# Patient Record
(demographics unavailable — no encounter records)

---

## 2024-10-18 NOTE — ASSESSMENT
[FreeTextEntry1] : 40 year old premenopausal woman with FHx of breast cancer and a history of R DCIS ER+ %/AK+ % s/p right breast excision on 2/3/23 and re-excision on 3/17/23, adjuvant RT (completed 9/2023), and tamoxifen since 9/2023 is here for f/u.  Previously under the care of Dr. Izaguirre.   Tolerating tamoxifen well.  No clinical evidence of disease.  She was previously on Pristiq but was switched to Zoloft then Prozac this year.  Advised her that Zoloft and Prozac are strong CYP2D6 inhibitors which may decrease serum concentrations of tamoxifen.  She will discuss alternate antidepressant/anxiety medication with her psychiatrist.  - Continue tamoxifen. - 12/6/24 MG/US - Continue follow up with Dr. Ibarra. - Continue yearly PCP, gyn, ophtho exams. - F/u 6 months.  78

## 2024-10-18 NOTE — HISTORY OF PRESENT ILLNESS
[de-identified] : 40 year old female with a history of Stage 0 pTisNxMx RIGHT DCIS ER+ %/OH+ % s/p right breast excision on 2/3/23 and re-excision for close margin on 3/17/23 (Brie Ibarra), adjuvant RT (completed 9/27/23), tamoxifen since 9/2023 is here for f/u.  She was previously under the care of Dr. Claudette Izaguirre.  FHx: Mother w/breast at 57, maternal grandmother with breast at 74, paternal grandmother with breast 74. Had genetic testing >5 years ago, which was negative, states likely checked BRCA gene only. States genetic testing for her mother was negative as well.   10/28/22 - [facility]: Screening Mammogram - Density D. There is nodular asymmetry slightly upper middle third right MLO projection which may represent superimposed breast tissue. Correlate is not demonstrated on the CC projection. BIRADS 0  11/03/22 - [facility]: Diagnostic Mammogram, Right & US Breast Complete, Right - Asymmetry in the upper aspect of the right breast is changeable with compression, consistent with normal glandular tissue. No discrete mass is identified. There is a grouping of small round and amorphous calcifications at about 11 o'clock position of the right breast middle to posterior depth. No suspicious solid mass.  Several small simple cysts. 0.7 cm cluster of microcysts at 11:00 position 2 cm from the nipple. BIRADS 4A.  12/02/22 [facility]: Right stereotactic biopsy: Top hat clip in appropriate position. Pathology reveals atypical ductal hyperplasia and atypical lobular hyperplasia, fibrocystic changes.  02/03/23 - [facility]: Breast Imaging Report - IMPRESSION: Specimen radiograph demonstrates the tophat clip and Dipti  clip. Correlation with final histopathology is recommended. FINAL HISTOPATHOLOGY: ADH, several foci. LCIS. Recent needle biopsy site with clip material, remote hemorrhage, chronic inflammation, granulation tissue, and reparative changes. Proliferative fibrocystic changes including usual ductal hyperplasia, apocrine metaplasia, adenosis, duct ectasia, and stromal fibrosis. Recommend annual screening mammogram and breast ultrasound due October 2023.  2/3/23 Right breast 11:00 excision (amendment): Ductal carcinoma in situ 4 mm, in 5 out of 10 tissue sections, solid and cribriform types, low nuclear grade, with NO luminal necrosis, with a focal direct involvement of inked medial-inferior margin. ER %, OH % Lobular carcinoma in situ with mild nuclear atypia (LCIS) (#A7). No evidence of invasive components. Adjacent needle biopsy site with clip material, remote hemorrhage, chronic inflammation, granulation tissue, and reparative changes. Proliferative fibrocystic changes, including usual ductal hyperplasia (UDH) (#A6), apocrine metaplasia, adenosis, duct ectasia, and stromal fibrosis.  3/17/23 Re-excision of right breast inferior-medial margin: Residual ductal carcinoma in situ  (DCIS ), several small foci, the largest horizontal span 2 mm, present in 3 out of 10 tissue sections, solid and cribriform types, low nuclear grade, with NO luminal necrosis, present within 0.1 - 1 mm of inked medial-inferior margin Lobular carcinoma in situ (LCIS). No evidence of invasive component is identified in the specimen received. Adjacent excisional biopsy site, showing chronic inflammation, giant cell tissue reaction, granulation tissue, and reparative changes. Fibrocystic changes. Addendum: The re-excisional slides show a wide spectrum of morphologic changes, including usual ductal hyperplasia (UDH), atypical ductal hyperplasia (ADH), and one or two foci of low-grade ductal carcinoma in situ (DCIS).  In addition, marked cauterized artifacts are noted.  The cauterized atyp ical focus present within 0.1 mm of the inked inferior margin may represent a UDH.  The focus of definite low-grade DCIS does present and measures at least 3 mm from the closest inked inferior margin.  In conclusion, all of the inked re-excisional margins are benign and free of DCIS.  08/09/23: [facility]: Diagnostic Mammogram Right and Ultrasound Breast Complete Right: Density C, Impression - No mammographic or sonographic evidence of malignancy. BI-RADS 2  12/05/23: ([facility]) Diagnostic Mammogram Bilateral and Breast Ultrasound: Density C, Impression - No mammographic findings of malignancy. Benign appearing right breast cyst 10:00 axis. probably benign lesions right 9:00 N2, right 9:00 N1, right 6:00 and left 8:00 axis. 6 month follow up targeted bilateral breast ultrasound. BI-RADS 3  6/6/24 b/l breast US ([facility]): Stable bilateral cysts.  8/30/24 MRI breast ([facility]): In the central inner right breast 3:00 2cmFN, there is a 4 mm focus of type 1 non-mass enhancement. In the left breast 5:00 7cm FN there is a 19 mm patchy area of type 1 nonmass enhancement. Targeted b/l breast US is suggested for more definitive characterization of these findings.  If no sonographic correlated can be identified, a six month short term f/u breast MRI would be appropriate to assess stability. BIRADS 3.  9/18/24 targeted b/l breast US ([facility]): No sonographic evidence of malignancy. Specifically, there is no sonographic abnormality corresponding to the areas of type 1 non-mass enhancement identified in the right breast 3 o'clock position, 2 cm from the nipple and left breast 5 o'clock position, 7 cm from the nipple on the previous breast MRI from 08/30/2024. A short-term follow-up breast MRI with and without contrast in 5-6 months is suggested to ensure stability of these findings. Annual screening mammography and bilateral whole breast ultrasound are also recommended, due in December 2024. BI-RADS 3 [de-identified] : She is taking tamoxifen daily.  Feels hot at times.  Gets migraines prior to menses, but lately it has been ok.  Has not taken Aimovig in awhile. Denies sob/sharma, and abnormal vaginal bleeding. She is scheduled for MG/US on 12/6/24.  She was previously on Pristiq, but was switched to Zoloft which she took for 3-4 months and now Prozac which she started last Monday.  PCP: 11/2024 GYN: 10/25/23, due Eye exam: 11-12/2023, annually, no vision changes LMP: 10/17/24 Exercise: physical active daily Diet: high protein, low sugar, ETOH 5-6 drinks on Friday.

## 2024-10-18 NOTE — REVIEW OF SYSTEMS
[Diarrhea: Grade 0] : Diarrhea: Grade 0 [Anxiety] : anxiety [Depression] : depression [Negative] : Allergic/Immunologic [FreeTextEntry2] : migraines  [de-identified] : headache improved [de-identified] : eczema improved [de-identified] : mood swings/irritability

## 2024-10-18 NOTE — ASSESSMENT
[FreeTextEntry1] : 40 year old premenopausal woman with FHx of breast cancer and a history of R DCIS ER+ %/MI+ % s/p right breast excision on 2/3/23 and re-excision on 3/17/23, adjuvant RT (completed 9/2023), and tamoxifen since 9/2023 is here for f/u.  Previously under the care of Dr. Izaguirre.   Tolerating tamoxifen well.  No clinical evidence of disease.  She was previously on Pristiq but was switched to Zoloft then Prozac this year.  Advised her that Zoloft and Prozac are strong CYP2D6 inhibitors which may decrease serum concentrations of tamoxifen.  She will discuss alternate antidepressant/anxiety medication with her psychiatrist.  - Continue tamoxifen. - 12/6/24 MG/US - Continue follow up with Dr. Ibarra. - Continue yearly PCP, gyn, ophtho exams. - F/u 6 months.

## 2024-10-18 NOTE — PHYSICAL EXAM
[Restricted in physically strenuous activity but ambulatory and able to carry out work of a light or sedentary nature] : Status 1- Restricted in physically strenuous activity but ambulatory and able to carry out work of a light or sedentary nature, e.g., light house work, office work [Normal] : bilateral breasts without nipple retraction, skin dimpling or palpable masses; the bilateral axillae are without adenopathy [de-identified] : right breast slight smaller than the left, no palpable masses [de-identified] : shoulder abduction to 180 degrees bilaterally

## 2024-10-18 NOTE — HISTORY OF PRESENT ILLNESS
[de-identified] : 40 year old female with a history of Stage 0 pTisNxMx RIGHT DCIS ER+ %/ID+ % s/p right breast excision on 2/3/23 and re-excision for close margin on 3/17/23 (Brie Ibarra), adjuvant RT (completed 9/27/23), tamoxifen since 9/2023 is here for f/u.  She was previously under the care of Dr. Claudette Izaguirre.  FHx: Mother w/breast at 57, maternal grandmother with breast at 74, paternal grandmother with breast 74. Had genetic testing >5 years ago, which was negative, states likely checked BRCA gene only. States genetic testing for her mother was negative as well.   10/28/22 - [facility]: Screening Mammogram - Density D. There is nodular asymmetry slightly upper middle third right MLO projection which may represent superimposed breast tissue. Correlate is not demonstrated on the CC projection. BIRADS 0  11/03/22 - [facility]: Diagnostic Mammogram, Right & US Breast Complete, Right - Asymmetry in the upper aspect of the right breast is changeable with compression, consistent with normal glandular tissue. No discrete mass is identified. There is a grouping of small round and amorphous calcifications at about 11 o'clock position of the right breast middle to posterior depth. No suspicious solid mass.  Several small simple cysts. 0.7 cm cluster of microcysts at 11:00 position 2 cm from the nipple. BIRADS 4A.  12/02/22 [facility]: Right stereotactic biopsy: Top hat clip in appropriate position. Pathology reveals atypical ductal hyperplasia and atypical lobular hyperplasia, fibrocystic changes.  02/03/23 - [facility]: Breast Imaging Report - IMPRESSION: Specimen radiograph demonstrates the tophat clip and Dipti  clip. Correlation with final histopathology is recommended. FINAL HISTOPATHOLOGY: ADH, several foci. LCIS. Recent needle biopsy site with clip material, remote hemorrhage, chronic inflammation, granulation tissue, and reparative changes. Proliferative fibrocystic changes including usual ductal hyperplasia, apocrine metaplasia, adenosis, duct ectasia, and stromal fibrosis. Recommend annual screening mammogram and breast ultrasound due October 2023.  2/3/23 Right breast 11:00 excision (amendment): Ductal carcinoma in situ 4 mm, in 5 out of 10 tissue sections, solid and cribriform types, low nuclear grade, with NO luminal necrosis, with a focal direct involvement of inked medial-inferior margin. ER %, ID % Lobular carcinoma in situ with mild nuclear atypia (LCIS) (#A7). No evidence of invasive components. Adjacent needle biopsy site with clip material, remote hemorrhage, chronic inflammation, granulation tissue, and reparative changes. Proliferative fibrocystic changes, including usual ductal hyperplasia (UDH) (#A6), apocrine metaplasia, adenosis, duct ectasia, and stromal fibrosis.  3/17/23 Re-excision of right breast inferior-medial margin: Residual ductal carcinoma in situ  (DCIS ), several small foci, the largest horizontal span 2 mm, present in 3 out of 10 tissue sections, solid and cribriform types, low nuclear grade, with NO luminal necrosis, present within 0.1 - 1 mm of inked medial-inferior margin Lobular carcinoma in situ (LCIS). No evidence of invasive component is identified in the specimen received. Adjacent excisional biopsy site, showing chronic inflammation, giant cell tissue reaction, granulation tissue, and reparative changes. Fibrocystic changes. Addendum: The re-excisional slides show a wide spectrum of morphologic changes, including usual ductal hyperplasia (UDH), atypical ductal hyperplasia (ADH), and one or two foci of low-grade ductal carcinoma in situ (DCIS).  In addition, marked cauterized artifacts are noted.  The cauterized atyp ical focus present within 0.1 mm of the inked inferior margin may represent a UDH.  The focus of definite low-grade DCIS does present and measures at least 3 mm from the closest inked inferior margin.  In conclusion, all of the inked re-excisional margins are benign and free of DCIS.  08/09/23: [facility]: Diagnostic Mammogram Right and Ultrasound Breast Complete Right: Density C, Impression - No mammographic or sonographic evidence of malignancy. BI-RADS 2  12/05/23: ([facility]) Diagnostic Mammogram Bilateral and Breast Ultrasound: Density C, Impression - No mammographic findings of malignancy. Benign appearing right breast cyst 10:00 axis. probably benign lesions right 9:00 N2, right 9:00 N1, right 6:00 and left 8:00 axis. 6 month follow up targeted bilateral breast ultrasound. BI-RADS 3  6/6/24 b/l breast US ([facility]): Stable bilateral cysts.  8/30/24 MRI breast ([facility]): In the central inner right breast 3:00 2cmFN, there is a 4 mm focus of type 1 non-mass enhancement. In the left breast 5:00 7cm FN there is a 19 mm patchy area of type 1 nonmass enhancement. Targeted b/l breast US is suggested for more definitive characterization of these findings.  If no sonographic correlated can be identified, a six month short term f/u breast MRI would be appropriate to assess stability. BIRADS 3.  9/18/24 targeted b/l breast US ([facility]): No sonographic evidence of malignancy. Specifically, there is no sonographic abnormality corresponding to the areas of type 1 non-mass enhancement identified in the right breast 3 o'clock position, 2 cm from the nipple and left breast 5 o'clock position, 7 cm from the nipple on the previous breast MRI from 08/30/2024. A short-term follow-up breast MRI with and without contrast in 5-6 months is suggested to ensure stability of these findings. Annual screening mammography and bilateral whole breast ultrasound are also recommended, due in December 2024. BI-RADS 3 [de-identified] : She is taking tamoxifen daily.  Feels hot at times.  Gets migraines prior to menses, but lately it has been ok.  Has not taken Aimovig in awhile. Denies sob/sharma, and abnormal vaginal bleeding. She is scheduled for MG/US on 12/6/24.  She was previously on Pristiq, but was switched to Zoloft which she took for 3-4 months and now Prozac which she started last Monday.  PCP: 11/2024 GYN: 10/25/23, due Eye exam: 11-12/2023, annually, no vision changes LMP: 10/17/24 Exercise: physical active daily Diet: high protein, low sugar, ETOH 5-6 drinks on Friday.

## 2024-10-18 NOTE — REVIEW OF SYSTEMS
[Diarrhea: Grade 0] : Diarrhea: Grade 0 [Anxiety] : anxiety [Depression] : depression [Negative] : Allergic/Immunologic [FreeTextEntry2] : migraines  [de-identified] : headache improved [de-identified] : eczema improved [de-identified] : mood swings/irritability

## 2024-10-18 NOTE — PHYSICAL EXAM
[Restricted in physically strenuous activity but ambulatory and able to carry out work of a light or sedentary nature] : Status 1- Restricted in physically strenuous activity but ambulatory and able to carry out work of a light or sedentary nature, e.g., light house work, office work [Normal] : bilateral breasts without nipple retraction, skin dimpling or palpable masses; the bilateral axillae are without adenopathy [de-identified] : right breast slight smaller than the left, no palpable masses [de-identified] : shoulder abduction to 180 degrees bilaterally

## 2025-03-28 NOTE — END OF VISIT
[FreeTextEntry3] :  I, Sweetie Iyer, acted as a scribe on behalf of Dr. Teresa Tiwari M.D, on 03/28/2025.   All medical entries made by the scribe were at my,  Dr. Teresa Tiwari M.D, direction and personally dictated by me on 03/28/2025. I have reviewed the chart and agree that the record accurately reflects my personal performance of the history, physical exam, assessment and plan. I have also personally directed, reviewed, and agreed with the chart.

## 2025-03-28 NOTE — HISTORY OF PRESENT ILLNESS
[FreeTextEntry1] : 41 year old presents for an annual exam. Pt had Copper IUD removed in 2022. Pt reports regular menses. Pt reports getting migraines with aura around her cycles, lasting for one day. Pt had egg freezing done (21 eggs). Pt has a MRI every 6 months and follows with her breast surgeon (Dr. Ibarra) s/p DCIS.   GYNHx: h/o DCIS s/p radiation, BRCA negative.  PMHx: Migraine + aura  PSHx: s/p right lumpectomy (12/2022)  FamHx: breast cancer (mom @ age 57, MGM @ age 70, PGM @ age 72)  Meds: Tamoxifen

## 2025-03-28 NOTE — PLAN
[FreeTextEntry1] : 41 year old for annual exam  -Pap done today -Rx Pelvic US given  RTO 1 year for annual

## 2025-05-02 NOTE — HISTORY OF PRESENT ILLNESS
[de-identified] : 41 year old female with a history of Stage 0 pTisNxMx RIGHT DCIS ER+ %/SC+ % s/p right breast excision on 2/3/23 and re-excision for close margin on 3/17/23 (Brie Ibarra), adjuvant RT (completed 9/27/23), tamoxifen since 9/2023 is here for f/u.  She was previously under the care of Dr. Claudette Izaguirre.  FHx: Mother w/breast at 57, maternal grandmother with breast at 74, paternal grandmother with breast 74. Had genetic testing >5 years ago, which was negative, states likely checked BRCA gene only. States genetic testing for her mother was negative as well.   10/28/22 - [facility]: Screening Mammogram - Density D. There is nodular asymmetry slightly upper middle third right MLO projection which may represent superimposed breast tissue. Correlate is not demonstrated on the CC projection. BIRADS 0  11/03/22 - [facility]: Diagnostic Mammogram, Right & US Breast Complete, Right - Asymmetry in the upper aspect of the right breast is changeable with compression, consistent with normal glandular tissue. No discrete mass is identified. There is a grouping of small round and amorphous calcifications at about 11 o'clock position of the right breast middle to posterior depth. No suspicious solid mass.  Several small simple cysts. 0.7 cm cluster of microcysts at 11:00 position 2 cm from the nipple. BIRADS 4A.  12/02/22 [facility]: Right stereotactic biopsy: Top hat clip in appropriate position. Pathology reveals atypical ductal hyperplasia and atypical lobular hyperplasia, fibrocystic changes.  02/03/23 - [facility]: Breast Imaging Report - IMPRESSION: Specimen radiograph demonstrates the tophat clip and Dipti  clip. Correlation with final histopathology is recommended. FINAL HISTOPATHOLOGY: ADH, several foci. LCIS. Recent needle biopsy site with clip material, remote hemorrhage, chronic inflammation, granulation tissue, and reparative changes. Proliferative fibrocystic changes including usual ductal hyperplasia, apocrine metaplasia, adenosis, duct ectasia, and stromal fibrosis. Recommend annual screening mammogram and breast ultrasound due October 2023.  2/3/23 Right breast 11:00 excision (amendment): Ductal carcinoma in situ 4 mm, in 5 out of 10 tissue sections, solid and cribriform types, low nuclear grade, with NO luminal necrosis, with a focal direct involvement of inked medial-inferior margin. ER %, SC % Lobular carcinoma in situ with mild nuclear atypia (LCIS) (#A7). No evidence of invasive components. Adjacent needle biopsy site with clip material, remote hemorrhage, chronic inflammation, granulation tissue, and reparative changes. Proliferative fibrocystic changes, including usual ductal hyperplasia (UDH) (#A6), apocrine metaplasia, adenosis, duct ectasia, and stromal fibrosis.  3/17/23 Re-excision of right breast inferior-medial margin: Residual ductal carcinoma in situ  (DCIS ), several small foci, the largest horizontal span 2 mm, present in 3 out of 10 tissue sections, solid and cribriform types, low nuclear grade, with NO luminal necrosis, present within 0.1 - 1 mm of inked medial-inferior margin Lobular carcinoma in situ (LCIS). No evidence of invasive component is identified in the specimen received. Adjacent excisional biopsy site, showing chronic inflammation, giant cell tissue reaction, granulation tissue, and reparative changes. Fibrocystic changes. Addendum: The re-excisional slides show a wide spectrum of morphologic changes, including usual ductal hyperplasia (UDH), atypical ductal hyperplasia (ADH), and one or two foci of low-grade ductal carcinoma in situ (DCIS).  In addition, marked cauterized artifacts are noted.  The cauterized atyp ical focus present within 0.1 mm of the inked inferior margin may represent a UDH.  The focus of definite low-grade DCIS does present and measures at least 3 mm from the closest inked inferior margin.  In conclusion, all of the inked re-excisional margins are benign and free of DCIS.  08/09/23: [facility]: Diagnostic Mammogram Right and Ultrasound Breast Complete Right: Density C, Impression - No mammographic or sonographic evidence of malignancy. BI-RADS 2  12/05/23: ([facility]) Diagnostic Mammogram Bilateral and Breast Ultrasound: Density C, Impression - No mammographic findings of malignancy. Benign appearing right breast cyst 10:00 axis. probably benign lesions right 9:00 N2, right 9:00 N1, right 6:00 and left 8:00 axis. 6 month follow up targeted bilateral breast ultrasound. BI-RADS 3  6/6/24 b/l breast US ([facility]): Stable bilateral cysts.  8/30/24 MRI breast ([facility]): In the central inner right breast 3:00 2cmFN, there is a 4 mm focus of type 1 non-mass enhancement. In the left breast 5:00 7cm FN there is a 19 mm patchy area of type 1 nonmass enhancement. Targeted b/l breast US is suggested for more definitive characterization of these findings.  If no sonographic correlated can be identified, a six month short term f/u breast MRI would be appropriate to assess stability. BIRADS 3.  9/18/24 targeted b/l breast US ([facility]): No sonographic evidence of malignancy. Specifically, there is no sonographic abnormality corresponding to the areas of type 1 non-mass enhancement identified in the right breast 3 o'clock position, 2 cm from the nipple and left breast 5 o'clock position, 7 cm from the nipple on the previous breast MRI from 08/30/2024. A short-term follow-up breast MRI with and without contrast in 5-6 months is suggested to ensure stability of these findings. Annual screening mammography and bilateral whole breast ultrasound are also recommended, due in December 2024. BI-RADS 3  12/6/24 MG/US ([facility]): No mammographic or ultrasonographic evidence of malignancy.   3/5/25 MRI breast ([facility]): Bilateral non mass enhancement in the right breast 3:00 2cmFN and left breast 5:00 7cm FN is not significantly changed compared to MRI from 8/30/24. Continued follow up with breast MR in 6 months is recommended.  [de-identified] : She is taking tamoxifen daily, tolerating well, no complaints. Gets migraines prior to menses, but lately it has been ok.  Has not taken Aimovig in awhile. Denies sob/sharma, and abnormal vaginal bleeding.  PCP: 3/13/25, labs ok. Received COVID vaccine, did not get flu vaccine this season. GYN: 3/28/25, WNL. Gyn wants to get US to check endometrial lining on tamoxifen. Eye exam: annually, no vision changes LMP: 14/2025, irregular Exercise: not as physically active lately, getting back to an exercise regimen. Diet: high protein, low sugar, ETOH 5-6 drinks on Friday. Psych: anxiety, on lexapro and gabapentin

## 2025-05-02 NOTE — BEGINNING OF VISIT
[0] : 2) Feeling down, depressed, or hopeless: Not at all (0) [PHQ-2 Negative] : PHQ-2 Negative [[id number]] : 0 [Never] : Never

## 2025-05-02 NOTE — HISTORY OF PRESENT ILLNESS
[de-identified] : 41 year old female with a history of Stage 0 pTisNxMx RIGHT DCIS ER+ %/[place]+ % s/p right breast excision on 2/3/23 and re-excision for close margin on 3/17/23 (Brie Ibarra), adjuvant RT (completed 9/27/23), tamoxifen since 9/2023 is here for f/u.  She was previously under the care of Dr. Claudette Izaguirre.  FHx: Mother w/breast at 57, maternal grandmother with breast at 74, paternal grandmother with breast 74. Had genetic testing >5 years ago, which was negative, states likely checked BRCA gene only. States genetic testing for her mother was negative as well.   10/28/22 - [facility]: Screening Mammogram - Density D. There is nodular asymmetry slightly upper middle third right MLO projection which may represent superimposed breast tissue. Correlate is not demonstrated on the CC projection. BIRADS 0  11/03/22 - [facility]: Diagnostic Mammogram, Right & US Breast Complete, Right - Asymmetry in the upper aspect of the right breast is changeable with compression, consistent with normal glandular tissue. No discrete mass is identified. There is a grouping of small round and amorphous calcifications at about 11 o'clock position of the right breast middle to posterior depth. No suspicious solid mass.  Several small simple cysts. 0.7 cm cluster of microcysts at 11:00 position 2 cm from the nipple. BIRADS 4A.  12/02/22 [facility]: Right stereotactic biopsy: Top hat clip in appropriate position. Pathology reveals atypical ductal hyperplasia and atypical lobular hyperplasia, fibrocystic changes.  02/03/23 - [facility]: Breast Imaging Report - IMPRESSION: Specimen radiograph demonstrates the tophat clip and Dipti  clip. Correlation with final histopathology is recommended. FINAL HISTOPATHOLOGY: ADH, several foci. LCIS. Recent needle biopsy site with clip material, remote hemorrhage, chronic inflammation, granulation tissue, and reparative changes. Proliferative fibrocystic changes including usual ductal hyperplasia, apocrine metaplasia, adenosis, duct ectasia, and stromal fibrosis. Recommend annual screening mammogram and breast ultrasound due October 2023.  2/3/23 Right breast 11:00 excision (amendment): Ductal carcinoma in situ 4 mm, in 5 out of 10 tissue sections, solid and cribriform types, low nuclear grade, with NO luminal necrosis, with a focal direct involvement of inked medial-inferior margin. ER %, [place] % Lobular carcinoma in situ with mild nuclear atypia (LCIS) (#A7). No evidence of invasive components. Adjacent needle biopsy site with clip material, remote hemorrhage, chronic inflammation, granulation tissue, and reparative changes. Proliferative fibrocystic changes, including usual ductal hyperplasia (UDH) (#A6), apocrine metaplasia, adenosis, duct ectasia, and stromal fibrosis.  3/17/23 Re-excision of right breast inferior-medial margin: Residual ductal carcinoma in situ  (DCIS ), several small foci, the largest horizontal span 2 mm, present in 3 out of 10 tissue sections, solid and cribriform types, low nuclear grade, with NO luminal necrosis, present within 0.1 - 1 mm of inked medial-inferior margin Lobular carcinoma in situ (LCIS). No evidence of invasive component is identified in the specimen received. Adjacent excisional biopsy site, showing chronic inflammation, giant cell tissue reaction, granulation tissue, and reparative changes. Fibrocystic changes. Addendum: The re-excisional slides show a wide spectrum of morphologic changes, including usual ductal hyperplasia (UDH), atypical ductal hyperplasia (ADH), and one or two foci of low-grade ductal carcinoma in situ (DCIS).  In addition, marked cauterized artifacts are noted.  The cauterized atyp ical focus present within 0.1 mm of the inked inferior margin may represent a UDH.  The focus of definite low-grade DCIS does present and measures at least 3 mm from the closest inked inferior margin.  In conclusion, all of the inked re-excisional margins are benign and free of DCIS.  08/09/23: [facility]: Diagnostic Mammogram Right and Ultrasound Breast Complete Right: Density C, Impression - No mammographic or sonographic evidence of malignancy. BI-RADS 2  12/05/23: ([facility]) Diagnostic Mammogram Bilateral and Breast Ultrasound: Density C, Impression - No mammographic findings of malignancy. Benign appearing right breast cyst 10:00 axis. probably benign lesions right 9:00 N2, right 9:00 N1, right 6:00 and left 8:00 axis. 6 month follow up targeted bilateral breast ultrasound. BI-RADS 3  6/6/24 b/l breast US ([facility]): Stable bilateral cysts.  8/30/24 MRI breast ([facility]): In the central inner right breast 3:00 2cmFN, there is a 4 mm focus of type 1 non-mass enhancement. In the left breast 5:00 7cm FN there is a 19 mm patchy area of type 1 nonmass enhancement. Targeted b/l breast US is suggested for more definitive characterization of these findings.  If no sonographic correlated can be identified, a six month short term f/u breast MRI would be appropriate to assess stability. BIRADS 3.  9/18/24 targeted b/l breast US ([facility]): No sonographic evidence of malignancy. Specifically, there is no sonographic abnormality corresponding to the areas of type 1 non-mass enhancement identified in the right breast 3 o'clock position, 2 cm from the nipple and left breast 5 o'clock position, 7 cm from the nipple on the previous breast MRI from 08/30/2024. A short-term follow-up breast MRI with and without contrast in 5-6 months is suggested to ensure stability of these findings. Annual screening mammography and bilateral whole breast ultrasound are also recommended, due in December 2024. BI-RADS 3  12/6/24 MG/US ([facility]): No mammographic or ultrasonographic evidence of malignancy.   3/5/25 MRI breast ([facility]): Bilateral non mass enhancement in the right breast 3:00 2cmFN and left breast 5:00 7cm FN is not significantly changed compared to MRI from 8/30/24. Continued follow up with breast MR in 6 months is recommended.  [de-identified] : She is taking tamoxifen daily, tolerating well, no complaints. Gets migraines prior to menses, but lately it has been ok.  Has not taken Aimovig in awhile. Denies sob/sharma, and abnormal vaginal bleeding.  PCP: 3/13/25, labs ok. Received COVID vaccine, did not get flu vaccine this season. GYN: 3/28/25, WNL. Gyn wants to get US to check endometrial lining on tamoxifen. Eye exam: annually, no vision changes LMP: 14/2025, irregular Exercise: not as physically active lately, getting back to an exercise regimen. Diet: high protein, low sugar, ETOH 5-6 drinks on Friday. Psych: anxiety, on lexapro and gabapentin

## 2025-05-02 NOTE — BEGINNING OF VISIT
[0] : 2) Feeling down, depressed, or hopeless: Not at all (0) [PHQ-2 Negative] : PHQ-2 Negative [[id number]] : 0 [Never] : Never

## 2025-05-02 NOTE — ASSESSMENT
[FreeTextEntry1] : 41 year old premenopausal woman with FHx of breast cancer and a history of R DCIS ER+ %/[place]+ % s/p right breast excision on 2/3/23 and re-excision on 3/17/23, adjuvant RT (completed 9/2023), and tamoxifen since 9/2023 is here for f/u.  Previously under the care of Dr. Izaguirre.   Tolerating tamoxifen well.  No clinical evidence of disease.  - Continue tamoxifen. - Continue follow up with Dr. Ibarra, repeat MRI breast scheduled on or around 9/2025. - Continue yearly PCP, gyn, ophtho exams. - F/u 6 months.

## 2025-05-02 NOTE — PHYSICAL EXAM
[Restricted in physically strenuous activity but ambulatory and able to carry out work of a light or sedentary nature] : Status 1- Restricted in physically strenuous activity but ambulatory and able to carry out work of a light or sedentary nature, e.g., light house work, office work [Normal] : affect appropriate [de-identified] : left breast slightly more ptotic.  no palpable masses in either breast, axillae or supraclavicular regions [de-identified] : shoulder abduction to 180 degrees bilaterally

## 2025-05-02 NOTE — BEGINNING OF VISIT
[0] : 2) Feeling down, depressed, or hopeless: Not at all (0) [PHQ-2 Negative] : PHQ-2 Negative [MCI3Guupp] : 0 [Never] : Never

## 2025-05-02 NOTE — ASSESSMENT
[FreeTextEntry1] : 41 year old premenopausal woman with FHx of breast cancer and a history of R DCIS ER+ %/GA+ % s/p right breast excision on 2/3/23 and re-excision on 3/17/23, adjuvant RT (completed 9/2023), and tamoxifen since 9/2023 is here for f/u.  Previously under the care of Dr. Izaguirre.   Tolerating tamoxifen well.  No clinical evidence of disease.  - Continue tamoxifen. - Continue follow up with Dr. Ibarra, repeat MRI breast scheduled on or around 9/2025. - Continue yearly PCP, gyn, ophtho exams. - F/u 6 months.

## 2025-05-02 NOTE — REVIEW OF SYSTEMS
[Diarrhea: Grade 0] : Diarrhea: Grade 0 [Anxiety] : anxiety [Depression] : depression [Negative] : Allergic/Immunologic [de-identified] : eczema improved [de-identified] : mood swings/irritability  [Suicidal] : not suicidal [FreeTextEntry2] : migraines  [de-identified] : occasional migraines

## 2025-05-02 NOTE — ASSESSMENT
[FreeTextEntry1] : 41 year old premenopausal woman with FHx of breast cancer and a history of R DCIS ER+ %/[place]+ % s/p right breast excision on 2/3/23 and re-excision on 3/17/23, adjuvant RT (completed 9/2023), and tamoxifen since 9/2023 is here for f/u.  Previously under the care of Dr. Iazguirre.   Tolerating tamoxifen well.  No clinical evidence of disease.  - Continue tamoxifen. - Continue follow up with Dr. Ibarra, repeat MRI breast scheduled on or around 9/2025. - Continue yearly PCP, gyn, ophtho exams. - F/u 6 months.

## 2025-05-02 NOTE — REVIEW OF SYSTEMS
[Diarrhea: Grade 0] : Diarrhea: Grade 0 [Anxiety] : anxiety [Depression] : depression [Negative] : Allergic/Immunologic [de-identified] : eczema improved [de-identified] : mood swings/irritability  [Suicidal] : not suicidal [FreeTextEntry2] : migraines  [de-identified] : occasional migraines

## 2025-05-02 NOTE — HISTORY OF PRESENT ILLNESS
[de-identified] : 41 year old female with a history of Stage 0 pTisNxMx RIGHT DCIS ER+ %/[place]+ % s/p right breast excision on 2/3/23 and re-excision for close margin on 3/17/23 (Brie Ibarra), adjuvant RT (completed 9/27/23), tamoxifen since 9/2023 is here for f/u.  She was previously under the care of Dr. Claudette Izaguirre.  FHx: Mother w/breast at 57, maternal grandmother with breast at 74, paternal grandmother with breast 74. Had genetic testing >5 years ago, which was negative, states likely checked BRCA gene only. States genetic testing for her mother was negative as well.   10/28/22 - [facility]: Screening Mammogram - Density D. There is nodular asymmetry slightly upper middle third right MLO projection which may represent superimposed breast tissue. Correlate is not demonstrated on the CC projection. BIRADS 0  11/03/22 - [facility]: Diagnostic Mammogram, Right & US Breast Complete, Right - Asymmetry in the upper aspect of the right breast is changeable with compression, consistent with normal glandular tissue. No discrete mass is identified. There is a grouping of small round and amorphous calcifications at about 11 o'clock position of the right breast middle to posterior depth. No suspicious solid mass.  Several small simple cysts. 0.7 cm cluster of microcysts at 11:00 position 2 cm from the nipple. BIRADS 4A.  12/02/22 [facility]: Right stereotactic biopsy: Top hat clip in appropriate position. Pathology reveals atypical ductal hyperplasia and atypical lobular hyperplasia, fibrocystic changes.  02/03/23 - [facility]: Breast Imaging Report - IMPRESSION: Specimen radiograph demonstrates the tophat clip and Dipti  clip. Correlation with final histopathology is recommended. FINAL HISTOPATHOLOGY: ADH, several foci. LCIS. Recent needle biopsy site with clip material, remote hemorrhage, chronic inflammation, granulation tissue, and reparative changes. Proliferative fibrocystic changes including usual ductal hyperplasia, apocrine metaplasia, adenosis, duct ectasia, and stromal fibrosis. Recommend annual screening mammogram and breast ultrasound due October 2023.  2/3/23 Right breast 11:00 excision (amendment): Ductal carcinoma in situ 4 mm, in 5 out of 10 tissue sections, solid and cribriform types, low nuclear grade, with NO luminal necrosis, with a focal direct involvement of inked medial-inferior margin. 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 No evidence of invasive components. Adjacent needle biopsy site with clip material, remote hemorrhage, chronic inflammation, granulation tissue, and reparative changes. Proliferative fibrocystic changes, including usual ductal hyperplasia (UDH) (#A6), apocrine metaplasia, adenosis, duct ectasia, and stromal fibrosis.  3/17/23 Re-excision of right breast inferior-medial margin: Residual ductal carcinoma in situ  (DCIS ), several small foci, the largest horizontal span 2 mm, present in 3 out of 10 tissue sections, solid and cribriform types, low nuclear grade, with NO luminal necrosis, present within 0.1 - 1 mm of inked medial-inferior margin Lobular carcinoma in situ (LCIS). No evidence of invasive component is identified in the specimen received. Adjacent excisional biopsy site, showing chronic inflammation, giant cell tissue reaction, granulation tissue, and reparative changes. Fibrocystic changes. Addendum: The re-excisional slides show a wide spectrum of morphologic changes, including usual ductal hyperplasia (UDH), atypical ductal hyperplasia (ADH), and one or two foci of low-grade ductal carcinoma in situ (DCIS).  In addition, marked cauterized artifacts are noted.  The cauterized atyp ical focus present within 0.1 mm of the inked inferior margin may represent a UDH.  The focus of definite low-grade DCIS does present and measures at least 3 mm from the closest inked inferior margin.  In conclusion, all of the inked re-excisional margins are benign and free of DCIS.  08/09/23: [facility]: Diagnostic Mammogram Right and Ultrasound Breast Complete Right: Density C, Impression - No mammographic or sonographic evidence of malignancy. BI-RADS 2  12/05/23: ([facility]) Diagnostic Mammogram Bilateral and Breast Ultrasound: Density C, Impression - No mammographic findings of malignancy. Benign appearing right breast cyst 10:00 axis. probably benign lesions right 9:00 N2, right 9:00 N1, right 6:00 and left 8:00 axis. 6 month follow up targeted bilateral breast ultrasound. BI-RADS 3  6/6/24 b/l breast US ([facility]): Stable bilateral cysts.  8/30/24 MRI breast ([facility]): In the central inner right breast 3:00 2cmFN, there is a 4 mm focus of type 1 non-mass enhancement. In the left breast 5:00 7cm FN there is a 19 mm patchy area of type 1 nonmass enhancement. Targeted b/l breast US is suggested for more definitive characterization of these findings.  If no sonographic correlated can be identified, a six month short term f/u breast MRI would be appropriate to assess stability. BIRADS 3.  9/18/24 targeted b/l breast US ([facility]): No sonographic evidence of malignancy. Specifically, there is no sonographic abnormality corresponding to the areas of type 1 non-mass enhancement identified in the right breast 3 o'clock position, 2 cm from the nipple and left breast 5 o'clock position, 7 cm from the nipple on the previous breast MRI from 08/30/2024. A short-term follow-up breast MRI with and without contrast in 5-6 months is suggested to ensure stability of these findings. Annual screening mammography and bilateral whole breast ultrasound are also recommended, due in December 2024. BI-RADS 3  12/6/24 MG/US ([facility]): No mammographic or ultrasonographic evidence of malignancy.   3/5/25 MRI breast ([facility]): Bilateral non mass enhancement in the right breast 3:00 2cmFN and left breast 5:00 7cm FN is not significantly changed compared to MRI from 8/30/24. Continued follow up with breast MR in 6 months is recommended.  [de-identified] : She is taking tamoxifen daily, tolerating well, no complaints. Gets migraines prior to menses, but lately it has been ok.  Has not taken Aimovig in awhile. Denies sob/sharma, and abnormal vaginal bleeding.  PCP: 3/13/25, labs ok. Received COVID vaccine, did not get flu vaccine this season. GYN: 3/28/25, WNL. Gyn wants to get US to check endometrial lining on tamoxifen. Eye exam: annually, no vision changes LMP: 14/2025, irregular Exercise: not as physically active lately, getting back to an exercise regimen. Diet: high protein, low sugar, ETOH 5-6 drinks on Friday. Psych: anxiety, on lexapro and gabapentin

## 2025-05-02 NOTE — PHYSICAL EXAM
[Restricted in physically strenuous activity but ambulatory and able to carry out work of a light or sedentary nature] : Status 1- Restricted in physically strenuous activity but ambulatory and able to carry out work of a light or sedentary nature, e.g., light house work, office work [Normal] : affect appropriate [de-identified] : left breast slightly more ptotic.  no palpable masses in either breast, axillae or supraclavicular regions [de-identified] : shoulder abduction to 180 degrees bilaterally

## 2025-05-02 NOTE — REVIEW OF SYSTEMS
[Diarrhea: Grade 0] : Diarrhea: Grade 0 [Anxiety] : anxiety [Depression] : depression [Negative] : Allergic/Immunologic [de-identified] : eczema improved [de-identified] : mood swings/irritability  [Suicidal] : not suicidal [FreeTextEntry2] : migraines  [de-identified] : occasional migraines

## 2025-05-02 NOTE — PHYSICAL EXAM
[Restricted in physically strenuous activity but ambulatory and able to carry out work of a light or sedentary nature] : Status 1- Restricted in physically strenuous activity but ambulatory and able to carry out work of a light or sedentary nature, e.g., light house work, office work [Normal] : affect appropriate [de-identified] : left breast slightly more ptotic.  no palpable masses in either breast, axillae or supraclavicular regions [de-identified] : shoulder abduction to 180 degrees bilaterally